# Patient Record
Sex: FEMALE | Race: ASIAN | NOT HISPANIC OR LATINO
[De-identification: names, ages, dates, MRNs, and addresses within clinical notes are randomized per-mention and may not be internally consistent; named-entity substitution may affect disease eponyms.]

---

## 2021-11-17 ENCOUNTER — NON-APPOINTMENT (OUTPATIENT)
Age: 76
End: 2021-11-17

## 2021-12-03 ENCOUNTER — NON-APPOINTMENT (OUTPATIENT)
Age: 76
End: 2021-12-03

## 2021-12-03 ENCOUNTER — APPOINTMENT (OUTPATIENT)
Dept: PULMONOLOGY | Facility: CLINIC | Age: 76
End: 2021-12-03
Payer: MEDICARE

## 2021-12-03 VITALS
TEMPERATURE: 97.7 F | OXYGEN SATURATION: 97 % | SYSTOLIC BLOOD PRESSURE: 135 MMHG | DIASTOLIC BLOOD PRESSURE: 77 MMHG | HEART RATE: 79 BPM

## 2021-12-03 DIAGNOSIS — E03.9 HYPOTHYROIDISM, UNSPECIFIED: ICD-10-CM

## 2021-12-03 DIAGNOSIS — J98.4 OTHER DISORDERS OF LUNG: ICD-10-CM

## 2021-12-03 DIAGNOSIS — E78.5 HYPERLIPIDEMIA, UNSPECIFIED: ICD-10-CM

## 2021-12-03 PROBLEM — Z00.00 ENCOUNTER FOR PREVENTIVE HEALTH EXAMINATION: Status: ACTIVE | Noted: 2021-12-03

## 2021-12-03 PROCEDURE — 71047 X-RAY EXAM CHEST 3 VIEWS: CPT

## 2021-12-03 PROCEDURE — 99203 OFFICE O/P NEW LOW 30 MIN: CPT | Mod: 25

## 2021-12-03 PROCEDURE — 71046 X-RAY EXAM CHEST 2 VIEWS: CPT | Mod: 59

## 2021-12-04 PROBLEM — E03.9 HYPOTHYROIDISM: Status: ACTIVE | Noted: 2021-12-04

## 2021-12-04 PROBLEM — E78.5 HYPERLIPIDEMIA: Status: ACTIVE | Noted: 2021-12-04

## 2021-12-04 RX ORDER — ROSUVASTATIN CALCIUM 5 MG/1
TABLET, FILM COATED ORAL
Refills: 0 | Status: ACTIVE | COMMUNITY

## 2021-12-04 RX ORDER — LEVOTHYROXINE SODIUM 0.17 MG/1
TABLET ORAL
Refills: 0 | Status: ACTIVE | COMMUNITY

## 2021-12-04 NOTE — HISTORY OF PRESENT ILLNESS
[TextBox_4] : Patient here for evaluation of abnormal chest CT.  Noted to have pneumatocele on outside chest CT. reports intermittent cough and phlegm some shortness of breath.  No asthma history non-smoker

## 2021-12-04 NOTE — PROCEDURE
[FreeTextEntry1] : CT chest dated August 6, 2020 noted for a stable pneumatocele in the left lower lobe.  Radiologist makes note of complete opacification in right lower lobe posterior basal segment suggestive of possible aspiration event.  I do not appreciate this finding on my review

## 2021-12-04 NOTE — ASSESSMENT
[FreeTextEntry1] : Pneumatocele on chest CT not visible by chest x-ray.  Should return for PFTs for further assessment.

## 2022-09-15 ENCOUNTER — APPOINTMENT (OUTPATIENT)
Dept: PULMONOLOGY | Facility: CLINIC | Age: 77
End: 2022-09-15

## 2022-09-15 VITALS
DIASTOLIC BLOOD PRESSURE: 80 MMHG | WEIGHT: 119 LBS | SYSTOLIC BLOOD PRESSURE: 140 MMHG | HEART RATE: 72 BPM | OXYGEN SATURATION: 97 %

## 2022-09-15 DIAGNOSIS — R05.9 COUGH, UNSPECIFIED: ICD-10-CM

## 2022-09-15 LAB — POCT - HEMOGLOBIN (HGB), QUANTITATIVE, TRANSCUTANEOUS: 14.2

## 2022-09-15 PROCEDURE — 94729 DIFFUSING CAPACITY: CPT

## 2022-09-15 PROCEDURE — ZZZZZ: CPT

## 2022-09-15 PROCEDURE — 94726 PLETHYSMOGRAPHY LUNG VOLUMES: CPT

## 2022-09-15 PROCEDURE — 88738 HGB QUANT TRANSCUTANEOUS: CPT

## 2022-09-15 PROCEDURE — 99214 OFFICE O/P EST MOD 30 MIN: CPT | Mod: 25

## 2022-09-15 PROCEDURE — 94010 BREATHING CAPACITY TEST: CPT

## 2022-09-15 NOTE — ASSESSMENT
[FreeTextEntry1] : repeat CT chest ordered to document stability\par i asked her to get HST done- high/moderate clinical suspicion but she would like to defer at this time\par i willl call her after ct chest

## 2022-09-15 NOTE — PHYSICAL EXAM
[No Acute Distress] : no acute distress [Well Nourished] : well nourished [Well Developed] : well developed [III] : Mallampati Class: III [No Resp Distress] : no resp distress [No Acc Muscle Use] : no acc muscle use [Clear to Auscultation Bilaterally] : clear to auscultation bilaterally [No Focal Deficits] : no focal deficits [Oriented x3] : oriented x3

## 2022-09-15 NOTE — HISTORY OF PRESENT ILLNESS
[Never] : never [TextBox_4] : RADHA PALMA is a 77 year old female who presents for f/\par she saw Dr Deshpande last year for incidental finding of penumocyle\par \par she is here for for formal pfts\par no new changes\par not on her anticholesterol meds\par \par she does feel hard to breathe when sleepin gleft lateral\par she does not snore. poor sleep. wakes up for nocturia. denies apneas\par \par

## 2022-09-15 NOTE — REASON FOR VISIT
[Follow-Up] : a follow-up visit [Abnormal CXR/ Chest CT] : an abnormal CXR/ chest CT [TextBox_44] : lizet